# Patient Record
Sex: MALE | Race: WHITE | HISPANIC OR LATINO | URBAN - METROPOLITAN AREA
[De-identification: names, ages, dates, MRNs, and addresses within clinical notes are randomized per-mention and may not be internally consistent; named-entity substitution may affect disease eponyms.]

---

## 2017-07-22 ENCOUNTER — HOSPITAL ENCOUNTER (INPATIENT)
Facility: MEDICAL CENTER | Age: 27
LOS: 1 days | DRG: 552 | End: 2017-07-23
Attending: EMERGENCY MEDICINE | Admitting: SURGERY

## 2017-07-22 ENCOUNTER — APPOINTMENT (OUTPATIENT)
Dept: RADIOLOGY | Facility: MEDICAL CENTER | Age: 27
DRG: 552 | End: 2017-07-22

## 2017-07-22 DIAGNOSIS — S32.000A LUMBAR COMPRESSION FRACTURE, CLOSED, INITIAL ENCOUNTER (HCC): ICD-10-CM

## 2017-07-22 DIAGNOSIS — M89.38 CALVARIAL THICKENING: ICD-10-CM

## 2017-07-22 DIAGNOSIS — F10.920 ALCOHOL INTOXICATION, UNCOMPLICATED (HCC): ICD-10-CM

## 2017-07-22 PROCEDURE — 72170 X-RAY EXAM OF PELVIS: CPT

## 2017-07-22 PROCEDURE — 71010 DX-CHEST-LIMITED (1 VIEW): CPT

## 2017-07-22 PROCEDURE — 700105 HCHG RX REV CODE 258: Performed by: EMERGENCY MEDICINE

## 2017-07-22 PROCEDURE — 96374 THER/PROPH/DIAG INJ IV PUSH: CPT

## 2017-07-22 PROCEDURE — 305948 HCHG GREEN TRAUMA ACT PRE-NOTIFY NO CC

## 2017-07-22 PROCEDURE — 80053 COMPREHEN METABOLIC PANEL: CPT

## 2017-07-22 PROCEDURE — 700111 HCHG RX REV CODE 636 W/ 250 OVERRIDE (IP): Performed by: EMERGENCY MEDICINE

## 2017-07-22 PROCEDURE — 99285 EMERGENCY DEPT VISIT HI MDM: CPT

## 2017-07-22 PROCEDURE — 80307 DRUG TEST PRSMV CHEM ANLYZR: CPT

## 2017-07-22 PROCEDURE — 36415 COLL VENOUS BLD VENIPUNCTURE: CPT

## 2017-07-22 PROCEDURE — 85730 THROMBOPLASTIN TIME PARTIAL: CPT

## 2017-07-22 PROCEDURE — 85610 PROTHROMBIN TIME: CPT

## 2017-07-22 PROCEDURE — 85027 COMPLETE CBC AUTOMATED: CPT

## 2017-07-22 RX ORDER — SODIUM CHLORIDE 9 MG/ML
INJECTION, SOLUTION INTRAVENOUS
Status: COMPLETED | OUTPATIENT
Start: 2017-07-22 | End: 2017-07-22

## 2017-07-22 RX ADMIN — FENTANYL CITRATE 50 MCG: 50 INJECTION, SOLUTION INTRAMUSCULAR; INTRAVENOUS at 23:47

## 2017-07-22 RX ADMIN — SODIUM CHLORIDE 1000 ML: 9 INJECTION, SOLUTION INTRAVENOUS at 23:49

## 2017-07-22 ASSESSMENT — PAIN SCALES - GENERAL: PAINLEVEL_OUTOF10: 9

## 2017-07-22 NOTE — IP AVS SNAPSHOT
7/23/2017    Marlo Giang  No address on file.    Dear Marlo:    [facility] wants to ensure your discharge home is safe and you or your loved ones have had all of your questions answered regarding your care after you leave the hospital.    Below is a list of resources and contact information should you have any questions regarding your hospital stay, follow-up instructions, or active medical symptoms.    Questions or Concerns Regarding… Contact   Medical Questions Related to Your Discharge  (7 days a week, 8am-5pm) Contact a Nurse Care Coordinator   [phone]   Medical Questions Not Related to Your Discharge  (24 hours a day / 7 days a week)  Contact the Nurse Health Line   [phone]    Medications or Discharge Instructions Refer to your discharge packet   or contact your [facility] Primary Care Provider   [phone]   Follow-up Appointment(s) Schedule your appointment via Storehouse   or contact Scheduling [phone]   Billing Review your statement via Storehouse  or contact Billing [phone]   Medical Records Review your records via Storehouse   or contact Medical Records [phone]     You may receive a telephone call within two days of discharge. This call is to make certain you understand your discharge instructions and have the opportunity to have any questions answered. You can also easily access your medical information, test results and upcoming appointments via the Storehouse free online health management tool. You can learn more and sign up at [employer]/Storehouse. For assistance setting up your Storehouse account, please call [phone].    Once again, we want to ensure your discharge home is safe and that you have a clear understanding of any next steps in your care. If you have any questions or concerns, please do not hesitate to contact us, we are here for you. Thank you for choosing [facility] for your healthcare needs.    Sincerely,    Your [facility] Healthcare Team

## 2017-07-22 NOTE — IP AVS SNAPSHOT
iStoryTime Access Code: [email or web address]  Expires: 8/22/2017 12:58 PM    Your email address is not on file at [facility].  Email Addresses are required for you to sign up for iStoryTime, please contact [phone] to verify your personal information and to provide your email address prior to attempting to register for iStoryTime.    Marlo Wright Giang  No address on file    iStoryTime  A secure, online tool to manage your health information     [employer]’s iStoryTime® is a secure, online tool that connects you to your personalized health information from the privacy of your home -- day or night - making it very easy for you to manage your healthcare. Once the activation process is completed, you can even access your medical information using the iStoryTime jasper, which is available for free in the Apple Jasper store or Google Play store.     To learn more about iStoryTime, visit [email or web address]    There are two levels of access available (as shown below):   My Chart Features  [facility] Primary Care Doctor [facility]  Specialists [facility]  Urgent  Care Non-[facility] Primary Care Doctor   Email your healthcare team securely and privately 24/7 X X X    Manage appointments: schedule your next appointment; view details of past/upcoming appointments X      Request prescription refills. X      View recent personal medical records, including lab and immunizations X X X X   View health record, including health history, allergies, medications X X X X   Read reports about your outpatient visits, procedures, consult and ER notes X X X X   See your discharge summary, which is a recap of your hospital and/or ER visit that includes your diagnosis, lab results, and care plan X X  X     How to register for [employer]:  Once your e-mail address has been verified, follow the following steps to sign up for [employer].     1. Go to  [email or web address]  2. Click on the Sign Up Now box, which takes you to the New Member Sign Up page. You will need  to provide the following information:  a. Enter your [employer] Access Code exactly as it appears at the top of this page. (You will not need to use this code after you’ve completed the sign-up process. If you do not sign up before the expiration date, you must request a new code.)   b. Enter your date of birth.   c. Enter your home email address.   d. Click Submit, and follow the next screen’s instructions.  3. Create a [employer] ID. This will be your [employer] login ID and cannot be changed, so think of one that is secure and easy to remember.  4. Create a [employer] password. You can change your password at any time.  5. Enter your Password Reset Question and Answer. This can be used at a later time if you forget your password.   6. Enter your e-mail address. This allows you to receive e-mail notifications when new information is available in [employer].  7. Click Sign Up. You can now view your health information.    For assistance activating your [employer] account, call [phone]

## 2017-07-22 NOTE — IP AVS SNAPSHOT
" Home Care Instructions                                                                                                                  Name:Marlo Giang  Medical Record Number:[id number]  CSN: [id number]    YOB: 1990   Age: 26 y.o.  Sex: male  HT:1.676 m (5' 5.98\") WT: 72.122 kg (159 lb)          Admit Date: 7/22/2017     Discharge Date:   Today's Date: 7/23/2017  Attending Doctor:  No att. providers found                  Allergies:  Review of patient's allergies indicates no known allergies.            Discharge Instructions       1.  Call or seek medical attention if questions or concerns arise   2.  Follow up with Dr. Juan Burden III neurosurgery, within one months time, as needed of if symptoms worsen   3.  No contact sports, heavy lifting, or strenuous activities until cleared neurosurgery   4.  No lifting greater than 10/15 pounds.  Call Dr. Burden tomorrow 7/24/17 for further instructions.   5.  Lumbar Sacral Orthosis when out of bed x 2-3 months   6. Seek immediate medical attention for neurologic decompensation                   Discharge Instructions    Discharged to home by car with relative. Discharged via walking, hospital escort: Yes.  Special equipment needed: TLSO    Be sure to schedule a follow-up appointment with your primary care doctor or any specialists as instructed.     Discharge Plan:   Diet Plan: Discussed  Activity Level: Discussed  Confirmed Follow up Appointment: Patient to Call and Schedule Appointment  Confirmed Symptoms Management: Discussed  Medication Reconciliation Updated: Yes  Influenza Vaccine Indication: Indicated: Not available from distributor/    I understand that a diet low in cholesterol, fat, and sodium is recommended for good health. Unless I have been given specific instructions below for another diet, I accept this instruction as my diet prescription.   Other diet: regular as tolerated.    Special Instructions: Fractura por " aplastamiento vertebral  (Spinal Compression Fracture)  La fractura por aplastamiento vertebral es la quebradura de los huesos que shawanda la columna (vértebras). En cayla tipo de fractura, las vértebras se aplastan y adquieren forma de cuña. La mayoría de las fracturas por aplastamiento se producen en la sección central o inferior de la columna vertebral.  CAUSAS  Cayla trastorno puede ser causado por lo siguiente:  · El debilitamiento y la pérdida de la densidad ósea (osteoporosis). Ésta es la causa más frecuente.  · Yaa caída.  · Un accidente de automóvil o motocicleta.  · Cáncer.  · Un traumatismo, yara un golpe marry y directo en la domitila.  FACTORES DE RIESGO  Puede correr más riesgo de sufrir yaa fractura por aplastamiento vertebral si:  · Es mayor de 50 años.  · Tiene osteoporosis.  · Tiene ciertos tipos de cáncer, entre ellos:  · Mieloma múltiple.  · Linfomas.  · Cáncer de próstata.  · Cáncer de pulmón.  · Cáncer de mama.  SÍNTOMAS  Los síntomas de esta afección incluyen lo siguiente:  · Dolor intenso.  · Dolor que empeora con el tiempo.  · Dolor que empeora al ponerse de pie, caminar, sentarse o inclinarse.  · Dolor repentino que es tan intenso que dificulta el movimiento.  · Columna vertebral curvada o jorobada.  · Disminución gradual de la estatura.  · Entumecimiento, hormigueo o debilidad en la espalda y las piernas.  · Dificultad para caminar.  Los síntomas dependerán de la causa de la fractura y de la rapidez de claudio evolución. Por ejemplo, las fracturas causadas por osteoporosis pueden producir pocos síntomas, ningún síntoma o síntomas que evolucionan lentamente con el tiempo.  DIAGNÓSTICO  Esta afección se puede diagnosticar en función de los síntomas, la historia clínica y un examen físico. Gee el examen físico, el médico puede darle golpes suaves a lo ole de la columna vertebral para determinar si hay dolor a la palpación. Pueden hacerse estudios para confirmar el diagnóstico. Estos pueden  incluir los siguientes:  · Lisbeth densitometría ósea para determinar la presencia de osteoporosis.  · Estudios de diagnóstico por imágenes, yara lisbeth radiografía de la columna vertebral, lisbeth tomografía computarizada (TC) o lisbeth resonancia magnética (RM).  TRATAMIENTO  El tratamiento de esta afección depende de la causa y la gravedad. Algunas fracturas, por ejemplo, las causadas por osteoporosis, pueden consolidarse por sí solas con tratamiento complementario. McNeal puede incluir lo siguiente:  · Analgésicos.  · Reposo.  · Un soporte para la espalda.  · Ejercicios de fisioterapia.  · Medicamentos para aliviar el dolor de huesos.  · Suplementos de calcio y vitamina D.  Las fracturas que causan deformidad de la espalda, neuralgia o debilidad, o que no responden a otros tratamientos se pueden tratar con un procedimiento quirúrgico, por ejemplo:  · Vertebroplastia. En holly procedimiento, se inyecta cemento óseo en las vértebras aplastadas para estabilizarlas.  · Cifoplastia con balón. En holly procedimiento, las vértebras aplastadas se expanden con un balón y luego se les inyecta el cemento óseo.  · Fusión vertebral. En holly procedimiento, las vértebras aplastadas se fusionan con las vértebras normales.  INSTRUCCIONES PARA EL CUIDADO EN EL HOGAR  Instrucciones generales  · Chicora los medicamentos solamente yara se lo haya indicado el médico.  · No conduzca ni opere maquinaria pesada mientras adonis analgésicos.  · Si se lo indican, aplique hielo sobre la abhijeet lesionada:  ¨ Ponga el hielo en lisbeth bolsa plástica.  ¨ Coloque lisbeth toalla entre la piel y la bolsa de hielo.  ¨ Al principio, deje el hielo en el lugar devon 30 minutos cada dos horas. Luego aplíquese hielo cuando sea necesario.  · Use el soporte para el zaida o la espalda yara se lo haya indicado el médico.  · No mojgan alcohol. El alcohol puede interferir en el tratamiento.  · Concurra a todas las visitas de control yara se lo haya indicado el médico. [place] es importante.  Puede ayudar a evitar las lesiones permanentes, la discapacidad y el dolor prolongado (crónico).  Actividad  · Immanuel reposo en cama solo yara se lo haya indicado el médico. El reposo en cama muy prolongado puede empeorar la afección.  · Reanude yoshi actividades normales yara se lo haya indicado el médico. Pregunte qué actividades son seguras para usted.  · Immanuel ejercicios para mejorar el movimiento y fortalecer la espalda (fisioterapia) yara se lo haya recomendado el médico.  · Immanuel ejercicio regularmente yara se lo haya indicado el médico.  SOLICITE ATENCIÓN MÉDICA SI:  · Tiene fiebre.  · Tiene tos que hace que el dolor sea más intenso.  · El medicamento no le calma el dolor.  · El dolor no mejora con el tiempo.  · No puede retomar las actividades normales según lo planeado o lo esperado.  SOLICITE ATENCIÓN MÉDICA DE INMEDIATO SI:  · El dolor es muy intenso y empeora repentinamente.  · No puede  alguna parte del cuerpo (parálisis) por debajo del nivel de la lesión.  · Tiene entumecimiento, hormigueo o debilidad en alguna parte del cuerpo por debajo del nivel de la lesión.  · No puede controlar los intestinos o la vejiga.     Esta información no tiene yara fin reemplazar el consejo del médico. Asegúrese de hacerle al médico cualquier pregunta que tenga.     Document Released: 12/18/2006 Document Revised: 05/03/2016  [employer] Interactive Patient Education ©2016 [employer]    Fractura lumbar  (Lumbar Fracture)  Se denomina fractura a la ruptura de un hueso. Yaa fractura en la abhijeet lumbar implica ruiz o más los 5 huesos que shawanda la región inferior de la espalda, kait por arriba de la pelvis.   CAUSAS  La mayoría de las lesiones ocurren yara resultado de un accidente yara por ejemplo:  · Yaa caída.  · Accidente automovilístico.  · Actividades recreativas.  · Un pequeño número ocurre debido a:  · Accidentes industriales, de aviación o en el [place].  · Heridas por santiago de princess o golpes directos en la  "espalda.  · Accidentes al practicar paracaidismo.  La mayoría de las fracturas lumbares afectan las porciones principales de la columna vertebral llamadas \"cuerpos vertebrales\" (observe la imagen de la derecha). Un pequeño número implica roturas de la parte del hueso que se extiende a los lados o hacia atrás del cuerpo vertebral. En los ancianos, lisbeth rotura súbita puede ocurrir sin causa aparente debido a que los huesos de la espalda se leon vuelto extremadamente finos y frágiles (enfermedad llamada osteoporosis).  SÍNTOMAS  Los pacientes con fracturas lumbares sienten un dolor intenso aún si la fractura es pequeña o limitada y no hay daño en los nervios circundantes. Las lesiones más graves o complejas que involucran a otros huesos y órganos incluyen:   · Deformidad de los huesos de la espalda.  · Hinchazón o hematoma sobre la abhijeet lesionada.  · Limitación de la capacidad para  la abhijeet afectada.  · Pérdida parcial o completa de la función de la vejiga o de los intestinos (puede deberse a lisbeth lesión en los nervios que los rodean).  · Las lesiones más graves también pueden ser causa de:  · Pérdida de la sensación o de la fuerza en las piernas, pies y dedos.  · Parálisis.  DIAGNÓSTICO  En la mayoría de los casos, se sospechará lisbeth fractura según lo que haya ocurrido kait antes del comienzo del dolor en la espalda. Para confirmar el diagnóstico y evaluar el tipo y la gravedad de la fractura se utilizan radiografías y estudios por imágenes (tomografía computada y resonancia magnética). Estas pruebas conducen el tratamiento louisa en algunos casos los estudios por imágenes no pueden realizarse. Por ejemplo la resonancia magnética no puede realizarse si tiene implantado algún dispositivo metálico (por ejemplo un marcapasos). En estos casos, se indicarán otras pruebas.  Si hubo lisbeth lesión neurológica podrán solicitarle otros estudios. Ellos son:  · Pruebas de la función neurológica a través de los músculos (estudios de " conducción nerviosa y electromiografía).  · Pruebas de la función de la vejiga (urodinámica).  · Estudios que se centran en definir problemas neurológicos específicos antes de lisbeth cirugía y cuáles son los logros posteriores a la cirugía (potenciales evocados).  TRATAMIENTO  Las lesiones más frecuentes pueden implicar lisbeth pequeña ruptura de la superficie del hueso de la espalda o puede julianne un aplastamiento parcial o compresión del hueso. En estos casos puede no ser necesaria la hospitalización. Analgésicos, soportes especiales para la espalda y limitación de las actividades es lo amilcar, antes de realizar un tratamiento de fisioterapia.  Las fracturas complejas, las fracturas múltiples o las lesiones inestables pueden dañar la médula rouse y requerir lisbeth operación para quitar presión de los nervios o de la médula rouse y para estabilizar las piezas rotas del hueso. Cada lesión que sufre un individuo es única y el cirujano tendrá en cuenta muchas cosas cuando planifique el mejor abordaje quirúrgico que le dará la mayor probabilidad de un buen resultado.   INSTRUCCIONES PARA EL CUIDADO DOMICILIARIO  Después de lisbeth fractura lumbar hay dolor y rigidez devon semanas. El reposo en cama, analgésicos y un lento retorno a la actividad es lo que generalmente se recomienda. Para reducir el dolor y aumentar la movilidad, podrán utilizarse soportes para el zaida y la espalda. Cuando el dolor se lo permita, lisbeth simple caminata ayudará a comenzar el proceso de regreso a las actividades normales. También son útiles los ejercicios para mejorar el movimiento y la fuerza de la espalda, luego que el dolor inicial mejore. Serán guiados por claudio médico y el equipo de jie (enfermeros, terapistas físicos, terapistas ocupacionales, etc.) En el mavsi de los ancianos, el tratamiento para la osteoporosis es fundamental para reducir el riesgo de fracturas en el futuro.  Deje previstas las visitas de control, según le hayan indicado para  asegurar un cuidado correcto a ole plazo y evitar futuras lesiones en la columna. Si no se realiza un seguimiento de acuerdo a lo indicado, podría sufrir un daño permanente, lisbeth discapacidad o dolor crónico.  SOLICITE ANTENCIÓN MÉDICA SI:  · Aumenta el dolor y no puede controlarlo con los medicamentos.  · No puede ir disminuyendo la dosis de analgésicos según lo planificado.  · El nivel de actividad no mejora, según lo esperado.  SOLICITE ATENCIÓN MÉDICA DE INMEDIATO SI OBSERVA:  · El dolor aumenta, vomita o no puede moverse.  · Siente adormecimiento, hormigueo, debilidad o parálisis en alguna parte del cuerpo.  · Pierde el control de la vejiga o del intestino.  · Tiene dificultad para respirar, tose, le sube la fiebre, siente dolor en el pecho o en el abdomen.  Document Released: 11/30/2009 Document Revised: 03/11/2013  [employer]® Patient Information ©2014 [employer].          Intoxicación alcohólica  (Alcohol Intoxication)  La intoxicación alcohólica ocurre cuando ha bebido la cantidad de alcohol suficiente para afectar claudio desenvolvimiento. Puede ser leve o muy grave. Beber gran cantidad de alcohol en un corto plazo se denomina borrachera. Puede ser muy nociva. Beber alcohol también puede ser muy peligroso si adonis medicamentos o utiliza otras drogas. Algunos de los efectos causados por el alcohol son:  · Pérdida de la coordinación.  · Cambios en el estado de ánimo y la conducta.  · Pensamiento confuso.  · Dificultad para hablar (arrastrar las palabras).  · Devolver la comida (vomitar).  · Confusión.  · Disminución de la frecuencia respiratoria.  · Sacudidas y temblores (convulsiones).  · Pérdida de la conciencia.  CUIDADOS EN EL HOGAR  · No conduzca vehículos después de beber alcohol.  · Mi gran cantidad de líquido para mantener el pis (orina) de jasmyn nolan o de color amarillo pálido. Evite la cafeína.  · Sólo tome los medicamentos que le haya indicado claudio médico.  SOLICITE AYUDA SI:  · Devuelve (vomita)  repetidas veces.  · No mejora luego de algunos días.  · Se intoxica con alcohol con frecuencia. El médico podrá ayudarlo a decidir si debe consultar a un terapeuta especializado en el abuso de sustancias.  SOLICITE AYUDA DE INMEDIATO SI:  · Siente temblores cuando jeff de beber.  · Tiene temblores o sacudidas.  · Vomita nelia. Puede ser de color heredia brillante o similar a la borra del café.  · Nota nelia en las heces (movimiento intestinal).  · Se siente mareado o se desvanece (se desmaya).  ASEGÚRESE DE QUE:   · Comprende estas instrucciones.  · Controlará claudio afección.  · Recibirá ayuda de inmediato si no mejora o si empeora.     Esta información no tiene yara fin reemplazar el consejo del médico. Asegúrese de hacerle al médico cualquier pregunta que tenga.     Document Released: 01/20/2012 Document Revised: 08/20/2014  [employer] Interactive Patient Education ©2016 [employer]  Depression / Suicide Risk    As you are discharged from this [facility] facility, it is important to learn how to keep safe from harming yourself.    Recognize the warning signs:  · Abrupt changes in personality, positive or negative- including increase in energy   · Giving away possessions  · Change in eating patterns- significant weight changes-  positive or negative  · Change in sleeping patterns- unable to sleep or sleeping all the time   · Unwillingness or inability to communicate  · Depression  · Unusual sadness, discouragement and loneliness  · Talk of wanting to die  · Neglect of personal appearance   · Rebelliousness- reckless behavior  · Withdrawal from people/activities they love  · Confusion- inability to concentrate     If you or a loved one observes any of these behaviors or has concerns about self-harm, here's what you can do:  · Talk about it- your feelings and reasons for harming yourself  · Remove any means that you might use to hurt yourself (examples: pills, rope, extension cords, firearm)  · Get professional help  from the community (Mental Health, Substance Abuse, psychological counseling)  · Do not be alone:Call your Safe Contact- someone whom you trust who will be there for you.  · Call your local CRISIS HOTLINE 020-6071 or 031-537-0186  · Call your local Children's Mobile Crisis Response Team [place] (190) 615-1902 or www.Movli  · Call the toll free National Suicide Prevention Hotlines   · National Suicide Prevention Lifeline 276-094-FTAM (0452)  · [place] Hope Line Network 800-SUICIDE (605-6179)        Follow-up Information     1. Follow up with Juan Burden III, M.D.. Schedule an appointment as soon as possible for a visit in 1 month.    Specialty:  Neurosurgery    Why:  As needed, If symptoms worsen    Contact information    [address]  [place] [address]  [phone]           Discharge Medication Instructions:    Below are the medications your physician expects you to take upon discharge:    Review all your home medications and newly ordered medications with your doctor and/or pharmacist. Follow medication instructions as directed by your doctor and/or pharmacist.    Please keep your medication list with you and share with your physician.               Medication List      Notice     You have not been prescribed any medications.            Instructions           Diet / Nutrition:    Follow any diet instructions given to you by your doctor or the dietician, including how much salt (sodium) you are allowed each day.    If you are overweight, talk to your doctor about a weight reduction plan.    Activity:    Remain physically active following your doctor's instructions about exercise and activity.    Rest often.     Any time you become even a little tired or short of breath, SIT DOWN and rest.    Worsening Symptoms:    Report any of the following signs and symptoms to the doctor's office immediately:    *Pain of jaw, arm, or neck  *Chest pain not relieved by medication                                *Dizziness or loss of consciousness  *Difficulty breathing even when at rest   *More tired than usual                                       *Bleeding drainage or swelling of surgical site  *Swelling of feet, ankles, legs or stomach                 *Fever (>100ºF)  *Pink or blood tinged sputum  *Weight gain (3lbs/day or 5lbs /week)           *Shock from internal defibrillator (if applicable)  *Palpitations or irregular heartbeats                *Cool and/or numb extremities    Stroke Awareness    Common Risk Factors for Stroke include:    Age  Atrial Fibrillation  Carotid Artery Stenosis  Diabetes Mellitus  Excessive alcohol consumption  High blood pressure  Overweight   Physical inactivity  Smoking    Warning signs and symptoms of a stroke include:    *Sudden numbness or weakness of the face, arm or leg (especially on one side of the body).  *Sudden confusion, trouble speaking or understanding.  *Sudden trouble seeing in one or both eyes.  *Sudden trouble walking, dizziness, loss of balance or coordination.Sudden severe headache with no known cause.    It is very important to get treatment quickly when a stroke occurs. If you experience any of the above warning signs, call 911 immediately.                   Disclaimer         Quit Smoking / Tobacco Use:    I understand the use of any tobacco products increases my chance of suffering from future heart disease or stroke and could cause other illnesses which may shorten my life. Quitting the use of tobacco products is the single most important thing I can do to improve my health. For further information on smoking / tobacco cessation call a Toll Free Quit Line at 1-222.633.7232 (*National Cancer [place]) or 1-257.385.5750 (American Lung Association) or you can access the web based program at www.lungusa.org.    [place] Tobacco Users Help Line:  (310) 419-8424       Toll Free: 1-457.988.8695  Quit Tobacco Program [facility]  (415) 880-5421    Crisis Hotline:    [place] Crisis Hotline:  2-105-NOQJYPE or 1-257.436.5709    [place] Crisis Hotline:    1-848.145.7822 or 412-146-3166    Discharge Survey:   Thank you for choosing [facility]. We hope we did everything we could to make your hospital stay a pleasant one. You may be receiving a phone survey and we would appreciate your time and participation in answering the questions. Your input is very valuable to us in our efforts to improve our service to our patients and their families.        My signature on this form indicates that:    1. I have reviewed and understand the above information.  2. My questions regarding this information have been answered to my satisfaction.  3. I have formulated a plan with my discharge nurse to obtain my prescribed medications for home.                  Disclaimer         __________________________________                     __________       ________                       Patient Signature                                                 Date                    Time

## 2017-07-23 ENCOUNTER — APPOINTMENT (OUTPATIENT)
Dept: RADIOLOGY | Facility: MEDICAL CENTER | Age: 27
DRG: 552 | End: 2017-07-23
Attending: NEUROLOGICAL SURGERY

## 2017-07-23 VITALS
HEIGHT: 66 IN | BODY MASS INDEX: 25.55 KG/M2 | RESPIRATION RATE: 17 BRPM | HEART RATE: 68 BPM | SYSTOLIC BLOOD PRESSURE: 101 MMHG | DIASTOLIC BLOOD PRESSURE: 61 MMHG | OXYGEN SATURATION: 93 % | TEMPERATURE: 99 F | WEIGHT: 159 LBS

## 2017-07-23 PROBLEM — F10.929 ALCOHOL INTOXICATION (HCC): Status: ACTIVE | Noted: 2017-07-23

## 2017-07-23 PROBLEM — S32.000A LUMBAR COMPRESSION FRACTURE (HCC): Status: ACTIVE | Noted: 2017-07-23

## 2017-07-23 LAB
ABO GROUP BLD: NORMAL
ABO GROUP BLD: NORMAL
ALBUMIN SERPL BCP-MCNC: 4.4 G/DL (ref 3.2–4.9)
ALBUMIN/GLOB SERPL: 1.6 G/DL
ALP SERPL-CCNC: 96 U/L (ref 30–99)
ALT SERPL-CCNC: 17 U/L (ref 2–50)
ANION GAP SERPL CALC-SCNC: 11 MMOL/L (ref 0–11.9)
APTT PPP: 26.7 SEC (ref 24.7–36)
AST SERPL-CCNC: 33 U/L (ref 12–45)
BILIRUB SERPL-MCNC: 0.6 MG/DL (ref 0.1–1.5)
BLD GP AB SCN SERPL QL: NORMAL
BUN SERPL-MCNC: 10 MG/DL (ref 8–22)
CALCIUM SERPL-MCNC: 8.9 MG/DL (ref 8.5–10.5)
CHLORIDE SERPL-SCNC: 107 MMOL/L (ref 96–112)
CO2 SERPL-SCNC: 24 MMOL/L (ref 20–33)
CREAT SERPL-MCNC: 0.84 MG/DL (ref 0.5–1.4)
ERYTHROCYTE [DISTWIDTH] IN BLOOD BY AUTOMATED COUNT: 36.8 FL (ref 35.9–50)
ETHANOL BLD-MCNC: 0.19 G/DL
GFR SERPL CREATININE-BSD FRML MDRD: >60 ML/MIN/1.73 M 2
GLOBULIN SER CALC-MCNC: 2.8 G/DL (ref 1.9–3.5)
GLUCOSE BLD-MCNC: 107 MG/DL (ref 65–99)
GLUCOSE BLD-MCNC: 109 MG/DL (ref 65–99)
GLUCOSE SERPL-MCNC: 109 MG/DL (ref 65–99)
HCT VFR BLD AUTO: 47 % (ref 42–52)
HGB BLD-MCNC: 17.4 G/DL (ref 14–18)
INR PPP: 1 (ref 0.87–1.13)
MCH RBC QN AUTO: 32.8 PG (ref 27–33)
MCHC RBC AUTO-ENTMCNC: 37 G/DL (ref 33.7–35.3)
MCV RBC AUTO: 88.5 FL (ref 81.4–97.8)
PLATELET # BLD AUTO: 208 K/UL (ref 164–446)
PMV BLD AUTO: 11.2 FL (ref 9–12.9)
POTASSIUM SERPL-SCNC: 3.6 MMOL/L (ref 3.6–5.5)
PROT SERPL-MCNC: 7.2 G/DL (ref 6–8.2)
PROTHROMBIN TIME: 13.5 SEC (ref 12–14.6)
RBC # BLD AUTO: 5.31 M/UL (ref 4.7–6.1)
RH BLD: NORMAL
SODIUM SERPL-SCNC: 142 MMOL/L (ref 135–145)
WBC # BLD AUTO: 8.8 K/UL (ref 4.8–10.8)

## 2017-07-23 PROCEDURE — 86900 BLOOD TYPING SEROLOGIC ABO: CPT

## 2017-07-23 PROCEDURE — 770006 HCHG ROOM/CARE - MED/SURG/GYN SEMI*

## 2017-07-23 PROCEDURE — 94760 N-INVAS EAR/PLS OXIMETRY 1: CPT

## 2017-07-23 PROCEDURE — G8978 MOBILITY CURRENT STATUS: HCPCS | Mod: CI

## 2017-07-23 PROCEDURE — 700117 HCHG RX CONTRAST REV CODE 255: Performed by: EMERGENCY MEDICINE

## 2017-07-23 PROCEDURE — 72100 X-RAY EXAM L-S SPINE 2/3 VWS: CPT

## 2017-07-23 PROCEDURE — A9270 NON-COVERED ITEM OR SERVICE: HCPCS | Performed by: SURGERY

## 2017-07-23 PROCEDURE — 700112 HCHG RX REV CODE 229: Performed by: SURGERY

## 2017-07-23 PROCEDURE — 700105 HCHG RX REV CODE 258: Performed by: SURGERY

## 2017-07-23 PROCEDURE — G8980 MOBILITY D/C STATUS: HCPCS | Mod: CI

## 2017-07-23 PROCEDURE — 72125 CT NECK SPINE W/O DYE: CPT

## 2017-07-23 PROCEDURE — 72128 CT CHEST SPINE W/O DYE: CPT

## 2017-07-23 PROCEDURE — 71260 CT THORAX DX C+: CPT

## 2017-07-23 PROCEDURE — L0637 LSO SC R ANT/POS PNL PRE CST: HCPCS

## 2017-07-23 PROCEDURE — 86850 RBC ANTIBODY SCREEN: CPT

## 2017-07-23 PROCEDURE — 86901 BLOOD TYPING SEROLOGIC RH(D): CPT

## 2017-07-23 PROCEDURE — 97161 PT EVAL LOW COMPLEX 20 MIN: CPT

## 2017-07-23 PROCEDURE — G8979 MOBILITY GOAL STATUS: HCPCS | Mod: CI

## 2017-07-23 PROCEDURE — 72131 CT LUMBAR SPINE W/O DYE: CPT

## 2017-07-23 PROCEDURE — 700102 HCHG RX REV CODE 250 W/ 637 OVERRIDE(OP): Performed by: SURGERY

## 2017-07-23 PROCEDURE — 36415 COLL VENOUS BLD VENIPUNCTURE: CPT

## 2017-07-23 PROCEDURE — 82962 GLUCOSE BLOOD TEST: CPT

## 2017-07-23 PROCEDURE — 70450 CT HEAD/BRAIN W/O DYE: CPT

## 2017-07-23 RX ORDER — ONDANSETRON 2 MG/ML
4 INJECTION INTRAMUSCULAR; INTRAVENOUS EVERY 4 HOURS PRN
Status: DISCONTINUED | OUTPATIENT
Start: 2017-07-23 | End: 2017-07-23 | Stop reason: HOSPADM

## 2017-07-23 RX ORDER — OXYCODONE HYDROCHLORIDE 10 MG/1
10 TABLET ORAL
Status: DISCONTINUED | OUTPATIENT
Start: 2017-07-23 | End: 2017-07-23 | Stop reason: HOSPADM

## 2017-07-23 RX ORDER — POLYETHYLENE GLYCOL 3350 17 G/17G
1 POWDER, FOR SOLUTION ORAL 2 TIMES DAILY
Status: DISCONTINUED | OUTPATIENT
Start: 2017-07-23 | End: 2017-07-23 | Stop reason: HOSPADM

## 2017-07-23 RX ORDER — IBUPROFEN 600 MG/1
600 TABLET ORAL 4 TIMES DAILY PRN
Status: DISCONTINUED | OUTPATIENT
Start: 2017-07-23 | End: 2017-07-23 | Stop reason: HOSPADM

## 2017-07-23 RX ORDER — ACETAMINOPHEN 500 MG
1000 TABLET ORAL EVERY 6 HOURS
Status: DISCONTINUED | OUTPATIENT
Start: 2017-07-23 | End: 2017-07-23 | Stop reason: HOSPADM

## 2017-07-23 RX ORDER — OXYCODONE HYDROCHLORIDE 5 MG/1
5 TABLET ORAL
Status: DISCONTINUED | OUTPATIENT
Start: 2017-07-23 | End: 2017-07-23 | Stop reason: HOSPADM

## 2017-07-23 RX ORDER — SODIUM CHLORIDE, SODIUM LACTATE, POTASSIUM CHLORIDE, CALCIUM CHLORIDE 600; 310; 30; 20 MG/100ML; MG/100ML; MG/100ML; MG/100ML
INJECTION, SOLUTION INTRAVENOUS CONTINUOUS
Status: DISCONTINUED | OUTPATIENT
Start: 2017-07-23 | End: 2017-07-23 | Stop reason: HOSPADM

## 2017-07-23 RX ORDER — BISACODYL 10 MG
10 SUPPOSITORY, RECTAL RECTAL
Status: DISCONTINUED | OUTPATIENT
Start: 2017-07-23 | End: 2017-07-23 | Stop reason: HOSPADM

## 2017-07-23 RX ORDER — AMOXICILLIN 250 MG
1 CAPSULE ORAL NIGHTLY
Status: DISCONTINUED | OUTPATIENT
Start: 2017-07-23 | End: 2017-07-23 | Stop reason: HOSPADM

## 2017-07-23 RX ORDER — DOCUSATE SODIUM 100 MG/1
100 CAPSULE, LIQUID FILLED ORAL 2 TIMES DAILY
Status: DISCONTINUED | OUTPATIENT
Start: 2017-07-23 | End: 2017-07-23 | Stop reason: HOSPADM

## 2017-07-23 RX ORDER — AMOXICILLIN 250 MG
1 CAPSULE ORAL
Status: DISCONTINUED | OUTPATIENT
Start: 2017-07-23 | End: 2017-07-23 | Stop reason: HOSPADM

## 2017-07-23 RX ORDER — MORPHINE SULFATE 4 MG/ML
2 INJECTION, SOLUTION INTRAMUSCULAR; INTRAVENOUS
Status: DISCONTINUED | OUTPATIENT
Start: 2017-07-23 | End: 2017-07-23 | Stop reason: HOSPADM

## 2017-07-23 RX ORDER — OXYCODONE HYDROCHLORIDE 5 MG/1
5-10 TABLET ORAL
Status: DISCONTINUED | OUTPATIENT
Start: 2017-07-23 | End: 2017-07-23

## 2017-07-23 RX ORDER — ENEMA 19; 7 G/133ML; G/133ML
1 ENEMA RECTAL
Status: DISCONTINUED | OUTPATIENT
Start: 2017-07-23 | End: 2017-07-23 | Stop reason: HOSPADM

## 2017-07-23 RX ADMIN — ACETAMINOPHEN 1000 MG: 500 TABLET ORAL at 02:40

## 2017-07-23 RX ADMIN — ACETAMINOPHEN 1000 MG: 500 TABLET ORAL at 05:40

## 2017-07-23 RX ADMIN — POLYETHYLENE GLYCOL 3350 1 PACKET: 17 POWDER, FOR SOLUTION ORAL at 08:31

## 2017-07-23 RX ADMIN — MAGNESIUM HYDROXIDE 30 ML: 400 SUSPENSION ORAL at 08:31

## 2017-07-23 RX ADMIN — IOHEXOL 100 ML: 350 INJECTION, SOLUTION INTRAVENOUS at 00:32

## 2017-07-23 RX ADMIN — DOCUSATE SODIUM 100 MG: 100 CAPSULE ORAL at 08:31

## 2017-07-23 RX ADMIN — OXYCODONE HYDROCHLORIDE 10 MG: 10 TABLET ORAL at 02:40

## 2017-07-23 RX ADMIN — OXYCODONE HYDROCHLORIDE 10 MG: 10 TABLET ORAL at 08:38

## 2017-07-23 RX ADMIN — OXYCODONE HYDROCHLORIDE 10 MG: 10 TABLET ORAL at 11:39

## 2017-07-23 RX ADMIN — OXYCODONE HYDROCHLORIDE 10 MG: 10 TABLET ORAL at 05:40

## 2017-07-23 RX ADMIN — SODIUM CHLORIDE, POTASSIUM CHLORIDE, SODIUM LACTATE AND CALCIUM CHLORIDE: 600; 310; 30; 20 INJECTION, SOLUTION INTRAVENOUS at 02:35

## 2017-07-23 RX ADMIN — ACETAMINOPHEN 1000 MG: 500 TABLET ORAL at 11:39

## 2017-07-23 ASSESSMENT — COGNITIVE AND FUNCTIONAL STATUS - GENERAL
MOBILITY SCORE: 24
SUGGESTED CMS G CODE MODIFIER MOBILITY: CH

## 2017-07-23 ASSESSMENT — PAIN SCALES - GENERAL
PAINLEVEL_OUTOF10: 7
PAINLEVEL_OUTOF10: 8
PAINLEVEL_OUTOF10: 9
PAINLEVEL_OUTOF10: 2
PAINLEVEL_OUTOF10: 9
PAINLEVEL_OUTOF10: 9

## 2017-07-23 ASSESSMENT — ENCOUNTER SYMPTOMS
FOCAL WEAKNESS: 0
VOMITING: 0
SENSORY CHANGE: 0
CHILLS: 0
NECK PAIN: 0
NAUSEA: 0
ABDOMINAL PAIN: 0
SPEECH CHANGE: 0
SHORTNESS OF BREATH: 0
MYALGIAS: 0
BACK PAIN: 1
FEVER: 0
HEADACHES: 0
DOUBLE VISION: 0

## 2017-07-23 ASSESSMENT — PATIENT HEALTH QUESTIONNAIRE - PHQ9
SUM OF ALL RESPONSES TO PHQ QUESTIONS 1-9: 0
2. FEELING DOWN, DEPRESSED, IRRITABLE, OR HOPELESS: NOT AT ALL
1. LITTLE INTEREST OR PLEASURE IN DOING THINGS: NOT AT ALL
SUM OF ALL RESPONSES TO PHQ9 QUESTIONS 1 AND 2: 0

## 2017-07-23 ASSESSMENT — LIFESTYLE VARIABLES
CONSUMPTION TOTAL: POSITIVE
AVERAGE NUMBER OF DAYS PER WEEK YOU HAVE A DRINK CONTAINING ALCOHOL: 0
HAVE PEOPLE ANNOYED YOU BY CRITICIZING YOUR DRINKING: NO
ON A TYPICAL DAY WHEN YOU DRINK ALCOHOL HOW MANY DRINKS DO YOU HAVE: 4
REASON UNABLE TO ASSESS: INTOXICATED
ALCOHOL_USE: YES
TOTAL SCORE: 0
EVER FELT BAD OR GUILTY ABOUT YOUR DRINKING: NO
HAVE YOU EVER FELT YOU SHOULD CUT DOWN ON YOUR DRINKING: NO
HOW MANY TIMES IN THE PAST YEAR HAVE YOU HAD 5 OR MORE DRINKS IN A DAY: 6
TOTAL SCORE: 0
TOTAL SCORE: 0
EVER_SMOKED: NEVER
EVER HAD A DRINK FIRST THING IN THE MORNING TO STEADY YOUR NERVES TO GET RID OF A HANGOVER: NO
EVER_SMOKED: NEVER

## 2017-07-23 ASSESSMENT — GAIT ASSESSMENTS
GAIT LEVEL OF ASSIST: MODIFIED INDEPENDENT
DISTANCE (FEET): 150

## 2017-07-23 NOTE — CARE PLAN
Problem: Safety  Goal: Will remain free from injury  Outcome: PROGRESSING AS EXPECTED  Pt used walker to get up to bathroom for the first time; found that he was steady on his feet to walk without it. Pt educated on how to use call light if he needs assistance.     Problem: Mobility  Goal: Risk for activity intolerance will decrease  Outcome: PROGRESSING AS EXPECTED  Pt has been ambulating in the halls frequently today.

## 2017-07-23 NOTE — PROGRESS NOTES
LSO was delivered and left at bedside. Pt was educated regarding application and removal of brace. For questions or adjustments contact the traction department at #[phone].

## 2017-07-23 NOTE — PROGRESS NOTES
Two RN skin check with Tita HYATT. No skin breakdown noted. Pt has swelling and redness around the left eye.
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
## 2017-07-23 NOTE — DISCHARGE PLANNING
Ongoing: Pt discharging home with family. Family at bedside. Pt given substance abuse information. No discharge needs at this time.

## 2017-07-23 NOTE — DISCHARGE INSTRUCTIONS
1.  Call or seek medical attention if questions or concerns arise   2.  Follow up with Dr. Juan Burden III neurosurgery, within one months time, as needed of if symptoms worsen   3.  No contact sports, heavy lifting, or strenuous activities until cleared neurosurgery   4.  No lifting greater than 10/15 pounds.  Call Dr. Burden tomorrow 7/24/17 for further instructions.   5.  Lumbar Sacral Orthosis when out of bed x 2-3 months   6. Seek immediate medical attention for neurologic decompensation                   Discharge Instructions    Discharged to home by car with relative. Discharged via walking, hospital escort: Yes.  Special equipment needed: TLSO    Be sure to schedule a follow-up appointment with your primary care doctor or any specialists as instructed.     Discharge Plan:   Diet Plan: Discussed  Activity Level: Discussed  Confirmed Follow up Appointment: Patient to Call and Schedule Appointment  Confirmed Symptoms Management: Discussed  Medication Reconciliation Updated: Yes  Influenza Vaccine Indication: Indicated: Not available from distributor/    I understand that a diet low in cholesterol, fat, and sodium is recommended for good health. Unless I have been given specific instructions below for another diet, I accept this instruction as my diet prescription.   Other diet: regular as tolerated.    Special Instructions: Fractura por aplastamiento vertebral  (Spinal Compression Fracture)  La fractura por aplastamiento vertebral es la quebradura de los huesos que shawanda la columna (vértebras). En cayla tipo de fractura, las vértebras se aplastan y adquieren forma de cuña. La mayoría de las fracturas por aplastamiento se producen en la sección central o inferior de la columna vertebral.  CAUSAS  Cayla trastorno puede ser causado por lo siguiente:  · El debilitamiento y la pérdida de la densidad ósea (osteoporosis). Ésta es la causa más frecuente.  · Yaa caída.  · Un accidente de automóvil o  motocicleta.  · Cáncer.  · Un traumatismo, yara un golpe marry y directo en la domitila.  FACTORES DE RIESGO  Puede correr más riesgo de sufrir lisbeth fractura por aplastamiento vertebral si:  · Es mayor de 50 años.  · Tiene osteoporosis.  · Tiene ciertos tipos de cáncer, entre ellos:  · Mieloma múltiple.  · Linfomas.  · Cáncer de próstata.  · Cáncer de pulmón.  · Cáncer de mama.  SÍNTOMAS  Los síntomas de esta afección incluyen lo siguiente:  · Dolor intenso.  · Dolor que empeora con el tiempo.  · Dolor que empeora al ponerse de pie, caminar, sentarse o inclinarse.  · Dolor repentino que es tan intenso que dificulta el movimiento.  · Columna vertebral curvada o jorobada.  · Disminución gradual de la estatura.  · Entumecimiento, hormigueo o debilidad en la espalda y las piernas.  · Dificultad para caminar.  Los síntomas dependerán de la causa de la fractura y de la rapidez de claudio evolución. Por ejemplo, las fracturas causadas por osteoporosis pueden producir pocos síntomas, ningún síntoma o síntomas que evolucionan lentamente con el tiempo.  DIAGNÓSTICO  Esta afección se puede diagnosticar en función de los síntomas, la historia clínica y un examen físico. Gee el examen físico, el médico puede darle golpes suaves a lo ole de la columna vertebral para determinar si hay dolor a la palpación. Pueden hacerse estudios para confirmar el diagnóstico. Estos pueden incluir los siguientes:  · Lisbeth densitometría ósea para determinar la presencia de osteoporosis.  · Estudios de diagnóstico por imágenes, yara lisbeth radiografía de la columna vertebral, lisbeth tomografía computarizada (TC) o lisbeth resonancia magnética (RM).  TRATAMIENTO  El tratamiento de esta afección depende de la causa y la gravedad. Algunas fracturas, por ejemplo, las causadas por osteoporosis, pueden consolidarse por sí solas con tratamiento complementario. [place] puede incluir lo siguiente:  · Analgésicos.  · Reposo.  · Un soporte para la espalda.  · Ejercicios de  fisioterapia.  · Medicamentos para aliviar el dolor de huesos.  · Suplementos de calcio y vitamina D.  Las fracturas que causan deformidad de la espalda, neuralgia o debilidad, o que no responden a otros tratamientos se pueden tratar con un procedimiento quirúrgico, por ejemplo:  · Vertebroplastia. En holly procedimiento, se inyecta cemento óseo en las vértebras aplastadas para estabilizarlas.  · Cifoplastia con balón. En holly procedimiento, las vértebras aplastadas se expanden con un balón y luego se les inyecta el cemento óseo.  · Fusión vertebral. En holly procedimiento, las vértebras aplastadas se fusionan con las vértebras normales.  INSTRUCCIONES PARA EL CUIDADO EN EL HOGAR  Instrucciones generales  · [place]m los medicamentos solamente yara se lo haya indicado el médico.  · No conduzca ni opere maquinaria pesada mientras adonis analgésicos.  · Si se lo indican, aplique hielo sobre la abhijeet lesionada:  ¨ Ponga el hielo en lisbeth bolsa plástica.  ¨ Coloque lisbeth toalla entre la piel y la bolsa de hielo.  ¨ Al principio, deje el hielo en el lugar devon 30 minutos cada dos horas. Luego aplíquese hielo cuando sea necesario.  · Use el soporte para el zaida o la espalda yara se lo haya indicado el médico.  · No mojgan alcohol. El alcohol puede interferir en el tratamiento.  · Concurra a todas las visitas de control yara se lo haya indicado el médico. [place] es importante. Puede ayudar a evitar las lesiones permanentes, la discapacidad y el dolor prolongado (crónico).  Actividad  · Immanuel reposo en cama solo yara se lo haya indicado el médico. El reposo en cama muy prolongado puede empeorar la afección.  · Reanude yoshi actividades normales yara se lo haya indicado el médico. Pregunte qué actividades son seguras para usted.  · Immanuel ejercicios para mejorar el movimiento y fortalecer la espalda (fisioterapia) yara se lo haya recomendado el médico.  · Immanuel ejercicio regularmente yara se lo haya indicado el médico.  SOLICITE ATENCIÓN  "MÉDICA SI:  · Tiene fiebre.  · Tiene tos que hace que el dolor sea más intenso.  · El medicamento no le calma el dolor.  · El dolor no mejora con el tiempo.  · No puede retomar las actividades normales según lo planeado o lo esperado.  SOLICITE ATENCIÓN MÉDICA DE INMEDIATO SI:  · El dolor es muy intenso y empeora repentinamente.  · No puede  alguna parte del cuerpo (parálisis) por debajo del nivel de la lesión.  · Tiene entumecimiento, hormigueo o debilidad en alguna parte del cuerpo por debajo del nivel de la lesión.  · No puede controlar los intestinos o la vejiga.     Esta información no tiene yara fin reemplazar el consejo del médico. Asegúrese de hacerle al médico cualquier pregunta que tenga.     Document Released: 12/18/2006 Document Revised: 05/03/2016  [employer] Patient Education ©2016 [employer]    Fractura lumbar  (Lumbar Fracture)  Se denomina fractura a la ruptura de un hueso. Lisbeth fractura en la abhijeet lumbar implica ruiz o más los 5 huesos que shawanda la región inferior de la espalda, kait por arriba de la pelvis.   CAUSAS  La mayoría de las lesiones ocurren yara resultado de un accidente yara por ejemplo:  · Lisbeth caída.  · Accidente automovilístico.  · Actividades recreativas.  · Un pequeño número ocurre debido a:  · Accidentes industriales, de aviación o en el [place].  · Heridas por santiago de prinecss o golpes directos en la espalda.  · Accidentes al practicar paracaidismo.  La mayoría de las fracturas lumbares afectan las porciones principales de la columna vertebral llamadas \"cuerpos vertebrales\" (observe la imagen de la derecha). Un pequeño número implica roturas de la parte del hueso que se extiende a los lados o hacia atrás del cuerpo vertebral. En los ancianos, lisbeth rotura súbita puede ocurrir sin causa aparente debido a que los huesos de la espalda se leon vuelto extremadamente finos y frágiles (enfermedad llamada osteoporosis).  SÍNTOMAS  Los pacientes con fracturas lumbares sienten " un dolor intenso aún si la fractura es pequeña o limitada y no hay daño en los nervios circundantes. Las lesiones más graves o complejas que involucran a otros huesos y órganos incluyen:   · Deformidad de los huesos de la espalda.  · Hinchazón o hematoma sobre la abhijeet lesionada.  · Limitación de la capacidad para  la abhijeet afectada.  · Pérdida parcial o completa de la función de la vejiga o de los intestinos (puede deberse a lisbeth lesión en los nervios que los rodean).  · Las lesiones más graves también pueden ser causa de:  · Pérdida de la sensación o de la fuerza en las piernas, pies y dedos.  · Parálisis.  DIAGNÓSTICO  En la mayoría de los casos, se sospechará lisbeth fractura según lo que haya ocurrido kait antes del comienzo del dolor en la espalda. Para confirmar el diagnóstico y evaluar el tipo y la gravedad de la fractura se utilizan radiografías y estudios por imágenes (tomografía computada y resonancia magnética). Estas pruebas conducen el tratamiento louisa en algunos casos los estudios por imágenes no pueden realizarse. Por ejemplo la resonancia magnética no puede realizarse si tiene implantado algún dispositivo metálico (por ejemplo un marcapasos). En estos casos, se indicarán otras pruebas.  Si hubo lisbeth lesión neurológica podrán solicitarle otros estudios. Ellos son:  · Pruebas de la función neurológica a través de los músculos (estudios de conducción nerviosa y electromiografía).  · Pruebas de la función de la vejiga (urodinámica).  · Estudios que se centran en definir problemas neurológicos específicos antes de lisbeth cirugía y cuáles son los logros posteriores a la cirugía (potenciales evocados).  TRATAMIENTO  Las lesiones más frecuentes pueden implicar lisbeth pequeña ruptura de la superficie del hueso de la espalda o puede julianne un aplastamiento parcial o compresión del hueso. En estos casos puede no ser necesaria la hospitalización. Analgésicos, soportes especiales para la espalda y limitación de las  actividades es lo amilcar, antes de realizar un tratamiento de fisioterapia.  Las fracturas complejas, las fracturas múltiples o las lesiones inestables pueden dañar la médula rouse y requerir lisbeth operación para quitar presión de los nervios o de la médula rouse y para estabilizar las piezas rotas del hueso. Cada lesión que sufre un individuo es única y el cirujano tendrá en cuenta muchas cosas cuando planifique el mejor abordaje quirúrgico que le dará la mayor probabilidad de un buen resultado.   INSTRUCCIONES PARA EL CUIDADO DOMICILIARIO  Después de lisbeth fractura lumbar hay dolor y rigidez devon semanas. El reposo en cama, analgésicos y un lento retorno a la actividad es lo que generalmente se recomienda. Para reducir el dolor y aumentar la movilidad, podrán utilizarse soportes para el zaida y la espalda. Cuando el dolor se lo permita, lisbeth simple caminata ayudará a comenzar el proceso de regreso a las actividades normales. También son útiles los ejercicios para mejorar el movimiento y la fuerza de la espalda, luego que el dolor inicial mejore. Serán guiados por claudio médico y el equipo de jie (enfermeros, terapistas físicos, terapistas ocupacionales, etc.) En el mavis de los ancianos, el tratamiento para la osteoporosis es fundamental para reducir el riesgo de fracturas en el futuro.  Deje previstas las visitas de control, según le hayan indicado para asegurar un cuidado correcto a ole plazo y evitar futuras lesiones en la columna. Si no se realiza un seguimiento de acuerdo a lo indicado, podría sufrir un daño permanente, lisbeth discapacidad o dolor crónico.  SOLICITE ANTENCIÓN MÉDICA SI:  · Aumenta el dolor y no puede controlarlo con los medicamentos.  · No puede ir disminuyendo la dosis de analgésicos según lo planificado.  · El nivel de actividad no mejora, según lo esperado.  SOLICITE ATENCIÓN MÉDICA DE INMEDIATO SI OBSERVA:  · El dolor aumenta, vomita o no puede moverse.  · Siente adormecimiento,  hormigueo, debilidad o parálisis en alguna parte del cuerpo.  · Pierde el control de la vejiga o del intestino.  · Tiene dificultad para respirar, tose, le sube la fiebre, siente dolor en el pecho o en el abdomen.  Document Released: 11/30/2009 Document Revised: 03/11/2013  [employer]® Patient Information ©2014 [employer].          Intoxicación alcohólica  (Alcohol Intoxication)  La intoxicación alcohólica ocurre cuando ha bebido la cantidad de alcohol suficiente para afectar claudio desenvolvimiento. Puede ser leve o muy grave. Beber gran cantidad de alcohol en un corto plazo se denomina borrachera. Puede ser muy nociva. Beber alcohol también puede ser muy peligroso si adonis medicamentos o utiliza otras drogas. Algunos de los efectos causados por el alcohol son:  · Pérdida de la coordinación.  · Cambios en el estado de ánimo y la conducta.  · Pensamiento confuso.  · Dificultad para hablar (arrastrar las palabras).  · Devolver la comida (vomitar).  · Confusión.  · Disminución de la frecuencia respiratoria.  · Sacudidas y temblores (convulsiones).  · Pérdida de la conciencia.  CUIDADOS EN EL HOGAR  · No conduzca vehículos después de beber alcohol.  · Mi gran cantidad de líquido para mantener el pis (orina) de jasmyn nolan o de color amarillo pálido. Evite la cafeína.  · Sólo tome los medicamentos que le haya indicado claudio médico.  SOLICITE AYUDA SI:  · Devuelve (vomita) repetidas veces.  · No mejora luego de algunos días.  · Se intoxica con alcohol con frecuencia. El médico podrá ayudarlo a decidir si debe consultar a un terapeuta especializado en el abuso de sustancias.  SOLICITE AYUDA DE INMEDIATO SI:  · Siente temblores cuando jeff de beber.  · Tiene temblores o sacudidas.  · Vomita nelia. Puede ser de color heredia brillante o similar a la borra del café.  · Nota nelia en las heces (movimiento intestinal).  · Se siente mareado o se desvanece (se desmaya).  ASEGÚRESE DE QUE:   · Comprende estas  instrucciones.  · Controlará claudio afección.  · Recibirá ayuda de inmediato si no mejora o si empeora.     Esta información no tiene yara fin reemplazar el consejo del médico. Asegúrese de hacerle al médico cualquier pregunta que tenga.     Document Released: 01/20/2012 Document Revised: 08/20/2014  [employer] Interactive Patient Education ©2016 [employer].  Depression / Suicide Risk    As you are discharged from this [facility] facility, it is important to learn how to keep safe from harming yourself.    Recognize the warning signs:  · Abrupt changes in personality, positive or negative- including increase in energy   · Giving away possessions  · Change in eating patterns- significant weight changes-  positive or negative  · Change in sleeping patterns- unable to sleep or sleeping all the time   · Unwillingness or inability to communicate  · Depression  · Unusual sadness, discouragement and loneliness  · Talk of wanting to die  · Neglect of personal appearance   · Rebelliousness- reckless behavior  · Withdrawal from people/activities they love  · Confusion- inability to concentrate     If you or a loved one observes any of these behaviors or has concerns about self-harm, here's what you can do:  · Talk about it- your feelings and reasons for harming yourself  · Remove any means that you might use to hurt yourself (examples: pills, rope, extension cords, firearm)  · Get professional help from the community (Mental Health, Substance Abuse, psychological counseling)  · Do not be alone:Call your Safe Contact- someone whom you trust who will be there for you.  · Call your local CRISIS HOTLINE 411-3756 or 707-810-3289  · Call your local Children's Mobile Crisis Response Team [place] (335) 105-9373 or www.[employer])  · Call the toll free National Suicide Prevention Hotlines   · National Suicide Prevention Lifeline 771-764-CWZA (4873)  · National Hope Line Network 800-SUICIDE (670-5527)
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
## 2017-07-23 NOTE — PROGRESS NOTES
Assumed care of pt. Pt complains of pain. Patient medicated per MAR. Pt was assisted with making a phone call. Pt is sleeping in bed.

## 2017-07-23 NOTE — THERAPY
"Physical Therapy Evaluation completed.   Bed Mobility:  Supine to Sit: Modified Independent  Transfers: Sit to Stand: Modified Independent  Gait: Level Of Assist: Modified Independent with No Equipment Needed       Plan of Care: Patient with no further skilled PT needs in the acute care setting at this time  Discharge Recommendations: Equipment: No Equipment Needed. Post-acute therapy Discharge to home with outpatient for spinal stabilization exercises when appropriate.    See \"Rehab Therapy-Acute\" Patient Summary Report for complete documentation.   Pt presents s/p vertebral fracture. He demonstrates ability to don/doff brace, and presents with no significant functional mobility issues at this time. He was educated and verbalized understanding on spinal precautions and safe mobility around the home. He is not in need of further skilled acute physical therapy at this time but may benefit from outpatient physical therapy for spinal stabilization exercises when cleared by doctor.   "

## 2017-07-23 NOTE — PROGRESS NOTES
Pt discharged home by walking self out with relative. Pt stated his dad will pick him up.  Pt inquiring about missing ID and wallet. Contacted [facility] to try to locate it. Informed pt I would be in contact with him if found. Pt met with financial dept this morning; [employer] requires pt to pay for a portion of services at time of discharge. I spoke with Shelley in financial dept., she directed them to go to the [facility],  the phone in the office next to Information, and she will come out and take pt's payment, which is $800. Pt IV discontinued, discharge instructions and follow up appointment info provided and discussed with patient, all questions answered, no other concerns from pt.

## 2022-11-23 NOTE — IP AVS SNAPSHOT
" <p align=\"LEFT\"><IMG SRC=\"//[email or web address]$/[email or web address]\" alt=\"Image\" WIDTH=\"50%\" HEIGHT=\"200\" BORDER=\"\"></p>                   Name:Marlo Giang  Medical Record Number:[id number]  CSN: [id number]    YOB: 1990   Age: 26 y.o.  Sex: male  HT:1.676 m (5' 5.98\") WT: 72.122 kg (159 lb)          Admit Date: 7/22/2017     Discharge Date:   Today's Date: 7/23/2017  Attending Doctor:  No att. providers found                  Allergies:  Review of patient's allergies indicates no known allergies.          Follow-up Information     1. Follow up with Juan Burden III, M.D.. Schedule an appointment as soon as possible for a visit in 1 month.    Specialty:  Neurosurgery    Why:  As needed, If symptoms worsen    Contact information    [address]  [place] [address]  [phone]           Medication List      Notice     You have not been prescribed any medications.      " Expiration Date (Month Year): 04-
